# Patient Record
Sex: MALE | Race: WHITE | Employment: FULL TIME | URBAN - METROPOLITAN AREA
[De-identification: names, ages, dates, MRNs, and addresses within clinical notes are randomized per-mention and may not be internally consistent; named-entity substitution may affect disease eponyms.]

---

## 2017-05-19 ENCOUNTER — OFFICE VISIT (OUTPATIENT)
Dept: FAMILY MEDICINE CLINIC | Facility: CLINIC | Age: 47
End: 2017-05-19

## 2017-05-19 VITALS
HEART RATE: 64 BPM | TEMPERATURE: 98 F | OXYGEN SATURATION: 99 % | DIASTOLIC BLOOD PRESSURE: 68 MMHG | HEIGHT: 73.5 IN | RESPIRATION RATE: 12 BRPM | BODY MASS INDEX: 28.54 KG/M2 | SYSTOLIC BLOOD PRESSURE: 120 MMHG | WEIGHT: 220 LBS

## 2017-05-19 DIAGNOSIS — G89.29 CHRONIC LEFT SHOULDER PAIN: ICD-10-CM

## 2017-05-19 DIAGNOSIS — M51.26 PROTRUDED LUMBAR DISC: ICD-10-CM

## 2017-05-19 DIAGNOSIS — M25.512 CHRONIC LEFT SHOULDER PAIN: ICD-10-CM

## 2017-05-19 DIAGNOSIS — M51.36 ANNULAR TEAR OF LUMBAR DISC: ICD-10-CM

## 2017-05-19 DIAGNOSIS — M51.36 DEGENERATIVE DISC DISEASE, LUMBAR: Primary | ICD-10-CM

## 2017-05-19 PROCEDURE — 99214 OFFICE O/P EST MOD 30 MIN: CPT | Performed by: FAMILY MEDICINE

## 2017-05-19 NOTE — PROGRESS NOTES
HPI:    Patient ID: Christina Perea is a 40year old male. HPI  Patient is here with complaint of having chronic exacerbations of his low back pain which radiates to the right lower extremity below the knee at times.   He also states of having a lot of pain ASSESSMENT/PLAN:   Degenerative disc disease, lumbar  (primary encounter diagnosis)  Protruded lumbar disc  Annular tear of lumbar disc  Chronic left shoulder pain  I did review patient's chart again and he has had some treatment in his own home country

## 2017-06-05 ENCOUNTER — TELEPHONE (OUTPATIENT)
Dept: FAMILY MEDICINE CLINIC | Facility: CLINIC | Age: 47
End: 2017-06-05

## 2017-06-05 ENCOUNTER — MED REC SCAN ONLY (OUTPATIENT)
Dept: FAMILY MEDICINE CLINIC | Facility: CLINIC | Age: 47
End: 2017-06-05

## 2017-06-05 NOTE — TELEPHONE ENCOUNTER
Received subpoena/authorization for release of any and all billing records, itemized billing statements, etc to be faxed to [phone] no later than 6-13-17. Faxed request to [employer] for processing.

## 2017-06-09 ENCOUNTER — TELEPHONE (OUTPATIENT)
Dept: FAMILY MEDICINE CLINIC | Facility: CLINIC | Age: 47
End: 2017-06-09

## 2017-06-09 NOTE — TELEPHONE ENCOUNTER
Received Med Rec req from [address] [place] [address] for records and billing copy sent to med rec dept

## 2017-06-22 ENCOUNTER — OFFICE VISIT (OUTPATIENT)
Dept: SURGERY | Facility: CLINIC | Age: 47
End: 2017-06-22

## 2017-06-22 VITALS
BODY MASS INDEX: 32.58 KG/M2 | WEIGHT: 220 LBS | OXYGEN SATURATION: 97 % | HEIGHT: 69 IN | RESPIRATION RATE: 16 BRPM | SYSTOLIC BLOOD PRESSURE: 124 MMHG | DIASTOLIC BLOOD PRESSURE: 74 MMHG | HEART RATE: 63 BPM

## 2017-06-22 DIAGNOSIS — G89.29 CHRONIC LEFT SHOULDER PAIN: ICD-10-CM

## 2017-06-22 DIAGNOSIS — M75.50 SUBDELTOID BURSITIS, UNSPECIFIED LATERALITY: ICD-10-CM

## 2017-06-22 DIAGNOSIS — M54.16 LUMBAR RADICULITIS: Primary | ICD-10-CM

## 2017-06-22 DIAGNOSIS — G89.29 CHRONIC RIGHT SHOULDER PAIN: ICD-10-CM

## 2017-06-22 DIAGNOSIS — M46.1 SACROILIITIS, NOT ELSEWHERE CLASSIFIED (HCC): ICD-10-CM

## 2017-06-22 DIAGNOSIS — M47.819 SPONDYLOSIS WITHOUT MYELOPATHY: ICD-10-CM

## 2017-06-22 DIAGNOSIS — M25.512 CHRONIC LEFT SHOULDER PAIN: ICD-10-CM

## 2017-06-22 DIAGNOSIS — M25.511 CHRONIC RIGHT SHOULDER PAIN: ICD-10-CM

## 2017-06-22 DIAGNOSIS — M54.16 RIGHT LUMBAR RADICULITIS: ICD-10-CM

## 2017-06-22 DIAGNOSIS — M47.816 LUMBAR FACET ARTHROPATHY: ICD-10-CM

## 2017-06-22 PROCEDURE — 99205 OFFICE O/P NEW HI 60 MIN: CPT | Performed by: PHYSICIAN ASSISTANT

## 2017-06-22 RX ORDER — GABAPENTIN 300 MG/1
300 CAPSULE ORAL 3 TIMES DAILY
Qty: 90 CAPSULE | Refills: 2 | Status: SHIPPED | OUTPATIENT
Start: 2017-06-22 | End: 2018-07-02 | Stop reason: ALTCHOICE

## 2017-06-22 NOTE — PATIENT INSTRUCTIONS
Refill policies:    • Allow 2-3 business days for refills; controlled substances may take longer.   • Contact your pharmacy at least 5 days prior to running out of medication and have them send an electronic request or submit request through the [facility] have a procedure or additional testing performed. [facility]) will contact your insurance carrier to obtain pre-certification or prior authorization.     Unfortunately, DARIRAN has seen an increase in denial of payment even though the p

## 2017-06-22 NOTE — PROGRESS NOTES
Name: David Bucio   : 1970   DOS: 2017     Chief complaint: Patient presents with:  New Patient: bilateral shoulder pain  Low Back Pain: mostly right side      History of present illness: Hosea Galeas is a 40year old male sent from Dr Monica Langley office complaining of  Bilateral R> L shoulder pain and Chronic low back pain shooting down the right buttock posterior thigh sometimes stops at the knee but often shoots down to the foot.   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 He did physical therapy which provided no help he also did 1 set of steroid injections with sounds like lumbar facet injections.     He  He states that the cold Constitutional: negative for fever, weight loss and malaise/fatigue  Cardiovascular:  Denies shortness of breath, chest pain, dyspnea on exertion, ischemia in extremities. Endocrine: Negative.  Specifically denies thyroid disorder, diabetes mellitus, osteo Examination of the lower back:  + Tenderness over bilateral lumbar facets and mild tenderness over the right SI. The right lumbar facet is the most painful area with moderate to severe level of pain.   Patient has straight leg raise on the right at 30° and CRANIOCERVICAL AREA:  Normal foramen magnum with no Chiari malformation.     PARASPINAL AREA:  Normal with no visible mass.     BONY STRUCTURES:  Vertebral body height maintained.     CORD:  Normal caliber, contour, and signal intensity.         =====  CON CONCLUSION:  The L2-3 disc reveals desiccation and disc height loss. There is an associated mild left lateral disc protrusion with an annular tear. There is abutment of the exiting left L2 nerve root with mild left neural foraminal stenosis.  Please    eduardo Pt going back to [facility] to use the mineral [employer] and will be coming back later. Patient try gabapentin 300 mg 3 times daily. For the first couple days he should try nightly and see if it makes him sleepy if it does not he should take it every 8 hours.

## 2017-07-06 ENCOUNTER — TELEPHONE (OUTPATIENT)
Dept: FAMILY MEDICINE CLINIC | Facility: CLINIC | Age: 47
End: 2017-07-06

## 2017-07-06 NOTE — TELEPHONE ENCOUNTER
Received subpoena/ request on 7-6-17 for any/all records, including billing statements, [facility] on behalf of [address] [place] [address]. Records were due 6-13-17. Please fax to [phone]. Faxed to [employer] for processing.

## 2017-08-08 ENCOUNTER — TELEPHONE (OUTPATIENT)
Dept: FAMILY MEDICINE CLINIC | Facility: CLINIC | Age: 47
End: 2017-08-08

## 2017-08-08 NOTE — TELEPHONE ENCOUNTER
Received request for medical records. See TE from 7-6 for specific information. Refaxed to [employer] for processing.

## 2018-07-02 ENCOUNTER — OFFICE VISIT (OUTPATIENT)
Dept: FAMILY MEDICINE CLINIC | Facility: CLINIC | Age: 48
End: 2018-07-02

## 2018-07-02 VITALS
OXYGEN SATURATION: 98 % | WEIGHT: 203 LBS | SYSTOLIC BLOOD PRESSURE: 118 MMHG | HEIGHT: 69 IN | TEMPERATURE: 98 F | RESPIRATION RATE: 12 BRPM | HEART RATE: 71 BPM | BODY MASS INDEX: 30.07 KG/M2 | DIASTOLIC BLOOD PRESSURE: 78 MMHG

## 2018-07-02 DIAGNOSIS — M54.41 CHRONIC BILATERAL LOW BACK PAIN WITH BILATERAL SCIATICA: ICD-10-CM

## 2018-07-02 DIAGNOSIS — M54.42 CHRONIC BILATERAL LOW BACK PAIN WITH BILATERAL SCIATICA: ICD-10-CM

## 2018-07-02 DIAGNOSIS — M79.602 PAIN IN BOTH UPPER EXTREMITIES: ICD-10-CM

## 2018-07-02 DIAGNOSIS — G89.29 CHRONIC BILATERAL LOW BACK PAIN WITH BILATERAL SCIATICA: ICD-10-CM

## 2018-07-02 DIAGNOSIS — M54.2 NECK PAIN: Primary | ICD-10-CM

## 2018-07-02 DIAGNOSIS — M79.601 PAIN IN BOTH UPPER EXTREMITIES: ICD-10-CM

## 2018-07-02 PROCEDURE — 99213 OFFICE O/P EST LOW 20 MIN: CPT | Performed by: FAMILY MEDICINE

## 2018-07-02 RX ORDER — ETODOLAC 400 MG/1
400 TABLET, FILM COATED ORAL 2 TIMES DAILY PRN
Qty: 60 TABLET | Refills: 0 | Status: SHIPPED | OUTPATIENT
Start: 2018-07-02 | End: 2018-08-20

## 2018-07-02 RX ORDER — HYDROCODONE BITARTRATE AND ACETAMINOPHEN 10; 325 MG/1; MG/1
1 TABLET ORAL EVERY 6 HOURS PRN
Qty: 20 TABLET | Refills: 0 | Status: SHIPPED | OUTPATIENT
Start: 2018-07-02 | End: 2018-08-20

## 2018-07-02 NOTE — PROGRESS NOTES
HPI:    Patient ID: Srinivas Cervantes is a 56year old male.     HPI  Patient is here with complaint of having chronic neck and low back pain with radiation to bilateral upper extremities and bilateral lower extremities but right lower extremity greater than left stretching. Will write note for work. No orders of the defined types were placed in this encounter.       Meds This Visit:  No prescriptions requested or ordered in this encounter    Imaging & Referrals:  None       CC#[id number]

## 2018-08-20 ENCOUNTER — OFFICE VISIT (OUTPATIENT)
Dept: FAMILY MEDICINE CLINIC | Facility: CLINIC | Age: 48
End: 2018-08-20
Payer: COMMERCIAL

## 2018-08-20 VITALS
TEMPERATURE: 98 F | DIASTOLIC BLOOD PRESSURE: 78 MMHG | BODY MASS INDEX: 32.14 KG/M2 | RESPIRATION RATE: 12 BRPM | HEART RATE: 67 BPM | HEIGHT: 69 IN | WEIGHT: 217 LBS | OXYGEN SATURATION: 100 % | SYSTOLIC BLOOD PRESSURE: 120 MMHG

## 2018-08-20 DIAGNOSIS — M54.41 CHRONIC BILATERAL LOW BACK PAIN WITH BILATERAL SCIATICA: ICD-10-CM

## 2018-08-20 DIAGNOSIS — M54.2 NECK PAIN: ICD-10-CM

## 2018-08-20 DIAGNOSIS — M51.36 DEGENERATIVE DISC DISEASE, LUMBAR: ICD-10-CM

## 2018-08-20 DIAGNOSIS — G89.29 CHRONIC BILATERAL LOW BACK PAIN WITH BILATERAL SCIATICA: ICD-10-CM

## 2018-08-20 DIAGNOSIS — M54.42 CHRONIC BILATERAL LOW BACK PAIN WITH BILATERAL SCIATICA: ICD-10-CM

## 2018-08-20 PROCEDURE — 99213 OFFICE O/P EST LOW 20 MIN: CPT | Performed by: FAMILY MEDICINE

## 2018-08-20 NOTE — PROGRESS NOTES
HPI:    Patient ID: Mikey Campos is a 56year old male. HPI  Patient is here for follow-up of chronic low back pain and neck pain. He states he is feeling much better now he is on no medication for pain.   He would like to return to work tomorrow without

## 2018-09-12 ENCOUNTER — TELEPHONE (OUTPATIENT)
Dept: FAMILY MEDICINE CLINIC | Facility: CLINIC | Age: 48
End: 2018-09-12

## 2018-09-12 NOTE — TELEPHONE ENCOUNTER
Dr Zaragoza Neighb[employer] rec'd forms to complete for w/c reimbursement pay for the patient.  He completed the forms and I faxed to [phone] - [facility] rec'd - sent to antonieta mares # [phone]

## 2018-11-12 ENCOUNTER — OFFICE VISIT (OUTPATIENT)
Dept: FAMILY MEDICINE CLINIC | Facility: CLINIC | Age: 48
End: 2018-11-12
Payer: COMMERCIAL

## 2018-11-12 VITALS
RESPIRATION RATE: 14 BRPM | BODY MASS INDEX: 32.88 KG/M2 | HEART RATE: 64 BPM | TEMPERATURE: 98 F | SYSTOLIC BLOOD PRESSURE: 118 MMHG | OXYGEN SATURATION: 100 % | HEIGHT: 69 IN | DIASTOLIC BLOOD PRESSURE: 78 MMHG | WEIGHT: 222 LBS

## 2018-11-12 DIAGNOSIS — G89.29 CHRONIC RIGHT SHOULDER PAIN: ICD-10-CM

## 2018-11-12 DIAGNOSIS — M25.511 CHRONIC RIGHT SHOULDER PAIN: ICD-10-CM

## 2018-11-12 DIAGNOSIS — M54.42 CHRONIC BILATERAL LOW BACK PAIN WITH BILATERAL SCIATICA: Primary | ICD-10-CM

## 2018-11-12 DIAGNOSIS — M54.2 CERVICAL PAIN: ICD-10-CM

## 2018-11-12 DIAGNOSIS — G89.29 CHRONIC BILATERAL LOW BACK PAIN WITH BILATERAL SCIATICA: Primary | ICD-10-CM

## 2018-11-12 DIAGNOSIS — M54.41 CHRONIC BILATERAL LOW BACK PAIN WITH BILATERAL SCIATICA: Primary | ICD-10-CM

## 2018-11-12 DIAGNOSIS — M51.36 DEGENERATIVE DISC DISEASE, LUMBAR: ICD-10-CM

## 2018-11-12 PROCEDURE — 99214 OFFICE O/P EST MOD 30 MIN: CPT | Performed by: FAMILY MEDICINE

## 2018-11-12 RX ORDER — HYDROCODONE BITARTRATE AND ACETAMINOPHEN 10; 325 MG/1; MG/1
1 TABLET ORAL EVERY 6 HOURS PRN
Qty: 30 TABLET | Refills: 0 | Status: SHIPPED | OUTPATIENT
Start: 2018-11-12 | End: 2019-01-21

## 2018-11-12 NOTE — PROGRESS NOTES
HPI:    Patient ID: Jen Nunez is a 53year old male. HPI  Patient states he would like a note to be off work for period of time due to exacerbation of neck pain, right shoulder pain, and low back pain.     Review of Systems  Except for the above, all o him a lot right now. He would also like refill of [place].  He states he would like off work for period of time. See work note. We will give off work accordingly.   I  told patient he should have a follow-up appointment at the end of the time that he is of

## 2018-12-03 ENCOUNTER — OFFICE VISIT (OUTPATIENT)
Dept: FAMILY MEDICINE CLINIC | Facility: CLINIC | Age: 48
End: 2018-12-03
Payer: COMMERCIAL

## 2018-12-03 VITALS
HEART RATE: 73 BPM | BODY MASS INDEX: 33.81 KG/M2 | TEMPERATURE: 98 F | OXYGEN SATURATION: 100 % | SYSTOLIC BLOOD PRESSURE: 114 MMHG | RESPIRATION RATE: 12 BRPM | DIASTOLIC BLOOD PRESSURE: 70 MMHG | HEIGHT: 69 IN | WEIGHT: 228.25 LBS

## 2018-12-03 DIAGNOSIS — M54.42 CHRONIC BILATERAL LOW BACK PAIN WITH BILATERAL SCIATICA: ICD-10-CM

## 2018-12-03 DIAGNOSIS — M51.36 DEGENERATIVE DISC DISEASE, LUMBAR: Primary | ICD-10-CM

## 2018-12-03 DIAGNOSIS — M54.41 CHRONIC BILATERAL LOW BACK PAIN WITH BILATERAL SCIATICA: ICD-10-CM

## 2018-12-03 DIAGNOSIS — G89.29 CHRONIC BILATERAL LOW BACK PAIN WITH BILATERAL SCIATICA: ICD-10-CM

## 2018-12-03 PROCEDURE — 99213 OFFICE O/P EST LOW 20 MIN: CPT | Performed by: FAMILY MEDICINE

## 2018-12-03 NOTE — PROGRESS NOTES
HPI:    Patient ID: Latoya Hidalgo is a 53year old male. HPI  Patient is here for follow-up of chronic low back pain with degenerative disc disease. He needs a form filled out to return to work which she did yesterday on 12-18.   Review of Systems  Negati defined types were placed in this encounter.       Meds This Visit:  Requested Prescriptions     Signed Prescriptions Disp Refills   • Elastic Bandages & Supports (LUMBAR BACK BRACE/SUPPORT PAD) Does not apply Misc 1 each 0     Si Application by Does no

## 2018-12-12 ENCOUNTER — TELEPHONE (OUTPATIENT)
Dept: FAMILY MEDICINE CLINIC | Facility: CLINIC | Age: 48
End: 2018-12-12

## 2018-12-12 ENCOUNTER — OFFICE VISIT (OUTPATIENT)
Dept: FAMILY MEDICINE CLINIC | Facility: CLINIC | Age: 48
End: 2018-12-12
Payer: COMMERCIAL

## 2018-12-12 VITALS
TEMPERATURE: 98 F | HEIGHT: 69 IN | OXYGEN SATURATION: 100 % | RESPIRATION RATE: 12 BRPM | SYSTOLIC BLOOD PRESSURE: 120 MMHG | HEART RATE: 79 BPM | DIASTOLIC BLOOD PRESSURE: 78 MMHG | WEIGHT: 224 LBS | BODY MASS INDEX: 33.18 KG/M2

## 2018-12-12 DIAGNOSIS — H66.92 LEFT OTITIS MEDIA, UNSPECIFIED OTITIS MEDIA TYPE: ICD-10-CM

## 2018-12-12 DIAGNOSIS — J02.9 SORE THROAT: Primary | ICD-10-CM

## 2018-12-12 DIAGNOSIS — R05.8 PRODUCTIVE COUGH: ICD-10-CM

## 2018-12-12 PROCEDURE — 99213 OFFICE O/P EST LOW 20 MIN: CPT | Performed by: FAMILY MEDICINE

## 2018-12-12 RX ORDER — AZITHROMYCIN 250 MG/1
TABLET, FILM COATED ORAL
Qty: 6 TABLET | Refills: 0 | Status: SHIPPED | OUTPATIENT
Start: 2018-12-12 | End: 2019-01-21

## 2018-12-12 RX ORDER — METHYLPREDNISOLONE 4 MG/1
TABLET ORAL
Qty: 1 KIT | Refills: 0 | Status: SHIPPED | OUTPATIENT
Start: 2018-12-12 | End: 2019-01-21

## 2018-12-12 NOTE — TELEPHONE ENCOUNTER
A few days ago - sore throat, coughing - dry - hard, short of breath at night, Feels feverish, Rattling in chest.  Son has pneumonia. I spoke with Dr. Zara Farr and he will see patient today at 12:15.

## 2018-12-12 NOTE — TELEPHONE ENCOUNTER
Pts wife is wanting for pt to be seen today. There are no openings for Dr. Aniya Rodriguez or José Luis Caraballo today. I tried to offer an appt tomorrow morning but Du Mcconnell is saying she feels pt needs to be seen and put on antibiotics today.  She is afraid it could be pneumonia

## 2018-12-12 NOTE — PROGRESS NOTES
HPI:   Latoya Hidalgo is a 53year old male that presents for   10 days ago- son was diagnosed with pneumonia    Patient has some cough congestion and sore throat for the past several days or more.   Past medical, surgical, family and social history reviewed i erythema or effusion   Nose: patent, no nasal discharge    Throat:  No tonsillar erythema or exudate. Mouth:  No oral lesions or ulcerations, good dentition. NECK: Supple, no cervical LAD, no thyromegaly.   SKIN: No rashes, no skin lesion, no bruising,

## 2019-01-21 ENCOUNTER — OFFICE VISIT (OUTPATIENT)
Dept: FAMILY MEDICINE CLINIC | Facility: CLINIC | Age: 49
End: 2019-01-21
Payer: COMMERCIAL

## 2019-01-21 VITALS
HEIGHT: 69 IN | OXYGEN SATURATION: 100 % | SYSTOLIC BLOOD PRESSURE: 130 MMHG | DIASTOLIC BLOOD PRESSURE: 70 MMHG | RESPIRATION RATE: 14 BRPM | HEART RATE: 82 BPM | TEMPERATURE: 98 F | WEIGHT: 235 LBS | BODY MASS INDEX: 34.8 KG/M2

## 2019-01-21 DIAGNOSIS — M54.41 ACUTE RIGHT-SIDED LOW BACK PAIN WITH RIGHT-SIDED SCIATICA: Primary | ICD-10-CM

## 2019-01-21 DIAGNOSIS — Z00.00 ROUTINE ADULT HEALTH MAINTENANCE: ICD-10-CM

## 2019-01-21 DIAGNOSIS — E55.9 VITAMIN D DEFICIENCY: ICD-10-CM

## 2019-01-21 DIAGNOSIS — R21 RASH: ICD-10-CM

## 2019-01-21 DIAGNOSIS — R05.9 COUGH: ICD-10-CM

## 2019-01-21 PROCEDURE — 99214 OFFICE O/P EST MOD 30 MIN: CPT | Performed by: FAMILY MEDICINE

## 2019-01-21 RX ORDER — HYDROCODONE BITARTRATE AND ACETAMINOPHEN 10; 325 MG/1; MG/1
1 TABLET ORAL EVERY 6 HOURS PRN
Qty: 30 TABLET | Refills: 0 | Status: SHIPPED | OUTPATIENT
Start: 2019-01-21 | End: 2019-06-11

## 2019-01-21 RX ORDER — PREDNISONE 20 MG/1
20 TABLET ORAL 2 TIMES DAILY
Qty: 10 TABLET | Refills: 0 | Status: SHIPPED | OUTPATIENT
Start: 2019-01-21 | End: 2019-01-26

## 2019-01-21 NOTE — PROGRESS NOTES
HPI:   Raulito Cardenas is a 53year old male that presents for low back pain. 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 He hurt his lower back on right side.    Pt ran out of [employer] and is requesting a well groomed. Physical Exam:  GEN:  Patient is alert, awake and oriented, well developed, well nourished, no apparent distress.   HEENT:     Head:  Normocephalic, atraumatic    Eyes: EOMI, PERRLA, no scleral icterus, conjunctivae clear, no eye discharge Mary Anne Aviles M.D.   Family Medicine   1/21/2019  11:21 AM

## 2019-03-08 ENCOUNTER — HOSPITAL ENCOUNTER (OUTPATIENT)
Dept: GENERAL RADIOLOGY | Age: 49
Discharge: HOME | End: 2019-03-08
Attending: FAMILY MEDICINE
Payer: COMMERCIAL

## 2019-03-08 ENCOUNTER — OFFICE VISIT (OUTPATIENT)
Dept: FAMILY MEDICINE CLINIC | Facility: CLINIC | Age: 49
End: 2019-03-08
Payer: COMMERCIAL

## 2019-03-08 ENCOUNTER — APPOINTMENT (OUTPATIENT)
Dept: LAB | Age: 49
End: 2019-03-08
Attending: FAMILY MEDICINE
Payer: COMMERCIAL

## 2019-03-08 VITALS
WEIGHT: 227 LBS | HEIGHT: 69 IN | BODY MASS INDEX: 33.62 KG/M2 | OXYGEN SATURATION: 99 % | HEART RATE: 89 BPM | SYSTOLIC BLOOD PRESSURE: 122 MMHG | RESPIRATION RATE: 14 BRPM | TEMPERATURE: 98 F | DIASTOLIC BLOOD PRESSURE: 70 MMHG

## 2019-03-08 DIAGNOSIS — R05.9 COUGH: ICD-10-CM

## 2019-03-08 DIAGNOSIS — E55.9 VITAMIN D DEFICIENCY: ICD-10-CM

## 2019-03-08 DIAGNOSIS — R21 RASH: ICD-10-CM

## 2019-03-08 DIAGNOSIS — M54.41 CHRONIC BILATERAL LOW BACK PAIN WITH RIGHT-SIDED SCIATICA: Primary | ICD-10-CM

## 2019-03-08 DIAGNOSIS — Z00.00 ROUTINE ADULT HEALTH MAINTENANCE: ICD-10-CM

## 2019-03-08 DIAGNOSIS — M54.2 CERVICAL PAIN: ICD-10-CM

## 2019-03-08 DIAGNOSIS — J30.9 ALLERGIC RHINITIS, UNSPECIFIED SEASONALITY, UNSPECIFIED TRIGGER: ICD-10-CM

## 2019-03-08 DIAGNOSIS — G89.29 CHRONIC BILATERAL LOW BACK PAIN WITH RIGHT-SIDED SCIATICA: Primary | ICD-10-CM

## 2019-03-08 LAB
ALBUMIN SERPL-MCNC: 4.2 G/DL (ref 3.4–5)
ALBUMIN/GLOB SERPL: 1.4 {RATIO} (ref 1–2)
ALP LIVER SERPL-CCNC: 56 U/L (ref 45–117)
ALT SERPL-CCNC: 46 U/L (ref 16–61)
ANION GAP SERPL CALC-SCNC: 8 MMOL/L (ref 0–18)
AST SERPL-CCNC: 18 U/L (ref 15–37)
BILIRUB SERPL-MCNC: 1.8 MG/DL (ref 0.1–2)
BUN BLD-MCNC: 15 MG/DL (ref 7–18)
BUN/CREAT SERPL: 15.8 (ref 10–20)
CALCIUM BLD-MCNC: 8.9 MG/DL (ref 8.5–10.1)
CHLORIDE SERPL-SCNC: 109 MMOL/L (ref 98–107)
CHOLEST SMN-MCNC: 176 MG/DL (ref ?–200)
CO2 SERPL-SCNC: 23 MMOL/L (ref 21–32)
CREAT BLD-MCNC: 0.95 MG/DL (ref 0.7–1.3)
DEPRECATED RDW RBC AUTO: 40.7 FL (ref 35.1–46.3)
ERYTHROCYTE [DISTWIDTH] IN BLOOD BY AUTOMATED COUNT: 12.4 % (ref 11–15)
GLOBULIN PLAS-MCNC: 3.1 G/DL (ref 2.8–4.4)
GLUCOSE BLD-MCNC: 99 MG/DL (ref 70–99)
HCT VFR BLD AUTO: 47.9 % (ref 39–53)
HDLC SERPL-MCNC: 43 MG/DL (ref 40–59)
HGB BLD-MCNC: 16.2 G/DL (ref 13–17.5)
LDLC SERPL CALC-MCNC: 115 MG/DL (ref ?–100)
M PROTEIN MFR SERPL ELPH: 7.3 G/DL (ref 6.4–8.2)
MCH RBC QN AUTO: 30.5 PG (ref 26–34)
MCHC RBC AUTO-ENTMCNC: 33.8 G/DL (ref 31–37)
MCV RBC AUTO: 90 FL (ref 80–100)
NONHDLC SERPL-MCNC: 133 MG/DL (ref ?–130)
OSMOLALITY SERPL CALC.SUM OF ELEC: 291 MOSM/KG (ref 275–295)
PLATELET # BLD AUTO: 235 10(3)UL (ref 150–450)
POTASSIUM SERPL-SCNC: 3.9 MMOL/L (ref 3.5–5.1)
RBC # BLD AUTO: 5.32 X10(6)UL (ref 4.3–5.7)
SODIUM SERPL-SCNC: 140 MMOL/L (ref 136–145)
T4 FREE SERPL-MCNC: 1 NG/DL (ref 0.8–1.7)
TRIGL SERPL-MCNC: 89 MG/DL (ref 30–149)
TSI SER-ACNC: 0.84 MIU/ML (ref 0.36–3.74)
VIT D+METAB SERPL-MCNC: 28.6 NG/ML (ref 30–100)
VLDLC SERPL CALC-MCNC: 18 MG/DL (ref 0–30)
WBC # BLD AUTO: 4.2 X10(3) UL (ref 4–11)

## 2019-03-08 PROCEDURE — 84443 ASSAY THYROID STIM HORMONE: CPT

## 2019-03-08 PROCEDURE — 80061 LIPID PANEL: CPT

## 2019-03-08 PROCEDURE — 99214 OFFICE O/P EST MOD 30 MIN: CPT | Performed by: FAMILY MEDICINE

## 2019-03-08 PROCEDURE — 36415 COLL VENOUS BLD VENIPUNCTURE: CPT

## 2019-03-08 PROCEDURE — 86003 ALLG SPEC IGE CRUDE XTRC EA: CPT

## 2019-03-08 PROCEDURE — 82306 VITAMIN D 25 HYDROXY: CPT

## 2019-03-08 PROCEDURE — 71046 X-RAY EXAM CHEST 2 VIEWS: CPT | Performed by: FAMILY MEDICINE

## 2019-03-08 PROCEDURE — 80053 COMPREHEN METABOLIC PANEL: CPT

## 2019-03-08 PROCEDURE — 82785 ASSAY OF IGE: CPT

## 2019-03-08 PROCEDURE — 85027 COMPLETE CBC AUTOMATED: CPT

## 2019-03-08 PROCEDURE — 84439 ASSAY OF FREE THYROXINE: CPT

## 2019-03-08 NOTE — PROGRESS NOTES
HPI:   Hector Agarwal is a 53year old male that presents for back pain and neck pain. Patient would like to get a referral to pain doctor. Patient states pain is getting worse and is starting to effect work. Patient is also complaining of lung issues. Estimated body mass index is 33.52 kg/m² as calculated from the following:    Height as of this encounter: 69\". Weight as of this encounter: 227 lb. Vital signs reviewed. Appears stated age, well groomed.   Physical Exam:  GEN:  Patient is alert, awake well.  Risks, benefits, and alternatives of current treatment plan discussed in detail. Questions and concerns addressed. Red flags to RTC or ED reviewed. Patient (or parent) agrees to plan. No Follow-up on file. Jonathan Manzo M.D.   Family Medic

## 2019-03-11 ENCOUNTER — TELEPHONE (OUTPATIENT)
Dept: SURGERY | Facility: CLINIC | Age: 49
End: 2019-03-11

## 2019-03-12 LAB
ALLERGEN,  IGE: 0.62 KU/L
ALLERGEN,  SHRIMP IGE: <0.1 KU/L
ALLERGEN,  TREE IGE: 1.5 KU/L
ALLERGEN, A.ALTERNATA(TENUIS): <0.1 KU/L
ALLERGEN, BERMUDA GRASS IGE: 2.35 KU/L
ALLERGEN, BOX ELDER/MAPLE IGE: 2.01 KU/L
ALLERGEN, CAT DANDER IGE: <0.1 KU/L
ALLERGEN, CLAM IGE: <0.1 KU/L
ALLERGEN, CODFISH: <0.1 KU/L
ALLERGEN, CORN IGE: <0.1 KU/L
ALLERGEN, COTTONWOOD IGE: 1.83 KU/L
ALLERGEN, D. FARINAE IGE: <0.1 KU/L
ALLERGEN, D.PTERONYSSINUS IGE: <0.1 KU/L
ALLERGEN, DOG DANDER IGE: <0.1 KU/L
ALLERGEN, EGG WHITE IGE: <0.1 KU/L
ALLERGEN, GERMAN COCKROACH IGE: <0.1 KU/L
ALLERGEN, HORMODENDRUM IGE: <0.1 KU/L
ALLERGEN, MARSH ELDER IGE: 2.36 KU/L
ALLERGEN, MILK (COW) IGE: <0.1 KU/L
ALLERGEN, MILK (COW) IGE: <0.1 KU/L
ALLERGEN, MOUNTAIN CEDAR IGE: 0.21 KU/L
ALLERGEN, MOUSE EPITHE IGE: <0.1 KU/L
ALLERGEN, MUCOR RACEMOSUS IGE: <0.1 KU/L
ALLERGEN, OAK TREE IGE: 2.87 KU/L
ALLERGEN, PEANUT IGE: 0.14 KU/L
ALLERGEN, PEANUT IGE: 0.14 KU/L
ALLERGEN, PECAN TREE IGE: 1.63 KU/L
ALLERGEN, PENICILLIUM NOTATUM: <0.1 KU/L
ALLERGEN, PIGWEED IGE: 1.85 KU/L
ALLERGEN, RUSSIAN THISTLE IGE: 1.26 KU/L
ALLERGEN, SCALLOP IGE: <0.1 KU/L
ALLERGEN, SHORT RAGWEED IGE: 12.8 KU/L
ALLERGEN, SOYBEAN IGE: <0.1 KU/L
ALLERGEN, TIMOTHY GRASS IGE: 2.27 KU/L
ALLERGEN, WALNUT TREE IGE: 2.09 KU/L
ALLERGEN, WALNUT/BLACK WALNUT: <0.1 KU/L
ALLERGEN, WHEAT IGE: <0.1 KU/L
ALLERGEN, WHITE ASH IGE: 2.28 KU/L
ALLERGEN, WHITE MULBERRY IGE: 0.98 KU/L
ALLERGEN,ASPERGILLUS FUMIGATUS: <0.1 KU/L
IMMUNOGLOBULIN E: 38 KU/L
IMMUNOGLOBULIN E: 38 KU/L

## 2019-03-29 ENCOUNTER — TELEPHONE (OUTPATIENT)
Dept: FAMILY MEDICINE CLINIC | Facility: CLINIC | Age: 49
End: 2019-03-29

## 2019-03-29 NOTE — TELEPHONE ENCOUNTER
----- Message from Chari Roque MD sent at 3/12/2019  2:28 PM CDT -----  F/u appt needed for 3/15/19(1 week from last appt on 3/8/19)

## 2019-04-16 ENCOUNTER — OFFICE VISIT (OUTPATIENT)
Dept: FAMILY MEDICINE CLINIC | Facility: CLINIC | Age: 49
End: 2019-04-16
Payer: COMMERCIAL

## 2019-04-16 VITALS
BODY MASS INDEX: 30.22 KG/M2 | SYSTOLIC BLOOD PRESSURE: 122 MMHG | HEIGHT: 73 IN | WEIGHT: 228 LBS | DIASTOLIC BLOOD PRESSURE: 80 MMHG | OXYGEN SATURATION: 99 % | HEART RATE: 80 BPM | TEMPERATURE: 98 F | RESPIRATION RATE: 16 BRPM

## 2019-04-16 DIAGNOSIS — J30.89 ALLERGIC RHINITIS DUE TO OTHER ALLERGIC TRIGGER, UNSPECIFIED SEASONALITY: Primary | ICD-10-CM

## 2019-04-16 DIAGNOSIS — Z91.09 ALLERGY TO ENVIRONMENTAL FACTORS: ICD-10-CM

## 2019-04-16 DIAGNOSIS — R06.2 WHEEZING: ICD-10-CM

## 2019-04-16 PROCEDURE — 99396 PREV VISIT EST AGE 40-64: CPT | Performed by: FAMILY MEDICINE

## 2019-04-16 PROCEDURE — 99213 OFFICE O/P EST LOW 20 MIN: CPT | Performed by: FAMILY MEDICINE

## 2019-04-16 RX ORDER — EMTRICITABINE AND TENOFOVIR DISOPROXIL FUMARATE 200; 300 MG/1; MG/1
1 TABLET, FILM COATED ORAL EVERY MORNING
COMMUNITY
Start: 2019-03-04 | End: 2019-06-11

## 2019-04-16 NOTE — PROGRESS NOTES
Jen Nunez is a 53year old male who presents for a complete physical exam.   HPI:   Pt complains having allergies where he has itching over the body. He did have allergy testing done. Also follow-up of neck and shoulder pain.   He has seen pain manageme Total 176 <200 mg/dL    HDL Cholesterol 43 40 - 59 mg/dL    Triglycerides 89 30 - 149 mg/dL    LDL Cholesterol 115 (H) <100 mg/dL    VLDL 18 0 - 30 mg/dL    Non HDL Chol 133 (H) <130 mg/dL   TSH+FREE T4   Result Value Ref Range    Free T4 1.0 0.8 - 1.7 ng/ Ref Range    Allergen, Clam IgE <0.10 <=0.34 kU/L    Allergen, Codfish <0.10 <=0.34 kU/L    Allergen, Corn IgE <0.10 <=0.34 kU/L    Allergen, Egg White IgE <0.10 <=0.34 kU/L    Allergen, Milk (Cow) IgE <0.10 <=0.34 kU/L    Allergen, Peanut IgE 0.14 <=0.34 in appetite  : denies nocturia or changes in urinary stream, denies scrotal mass/abnormal discharge from urethra  MUSCULOSKELETAL: denies joint or muscle aches or pains  NEURO: denies headaches/dizziness  PSYCH: denies depression or anxiety  HEMATOLOGIC: today for lab results.     Diagnoses and all orders for this visit:    Allergic rhinitis due to other allergic trigger, unspecified seasonality    Allergy to environmental factors    Wheezing      Patient counseled at length regarding blood work and allergy

## 2019-04-16 NOTE — PROGRESS NOTES
HPI:   Caitlin Horne is a 53year old male that presents for lab discussion. Patient completed labs on 3/8/19 and he would like to discuss them. Past medical, surgical, family and social history reviewed in detail with patient.   Updated where appropriate Ears: External normal. TMs normal without erythema or effusion   Nose: patent, no nasal discharge    Throat:  No tonsillar erythema or exudate. Mouth:  No oral lesions or ulcerations, good dentition. NECK: Supple, no cervical LAD, no thyromegaly.

## 2019-06-11 ENCOUNTER — OFFICE VISIT (OUTPATIENT)
Dept: FAMILY MEDICINE CLINIC | Facility: CLINIC | Age: 49
End: 2019-06-11
Payer: COMMERCIAL

## 2019-06-11 VITALS
RESPIRATION RATE: 16 BRPM | WEIGHT: 221 LBS | HEART RATE: 63 BPM | BODY MASS INDEX: 29.29 KG/M2 | HEIGHT: 73 IN | TEMPERATURE: 98 F | DIASTOLIC BLOOD PRESSURE: 80 MMHG | SYSTOLIC BLOOD PRESSURE: 124 MMHG | OXYGEN SATURATION: 99 %

## 2019-06-11 DIAGNOSIS — R22.1 THROAT SWELLING: Primary | ICD-10-CM

## 2019-06-11 DIAGNOSIS — L30.9 DERMATITIS: ICD-10-CM

## 2019-06-11 DIAGNOSIS — R06.83 SNORING: ICD-10-CM

## 2019-06-11 DIAGNOSIS — J30.9 ALLERGIC RHINITIS, UNSPECIFIED SEASONALITY, UNSPECIFIED TRIGGER: ICD-10-CM

## 2019-06-11 PROCEDURE — 99214 OFFICE O/P EST MOD 30 MIN: CPT | Performed by: FAMILY MEDICINE

## 2019-06-11 RX ORDER — HYDROCODONE BITARTRATE AND ACETAMINOPHEN 10; 325 MG/1; MG/1
1 TABLET ORAL EVERY 6 HOURS PRN
Qty: 30 TABLET | Refills: 0 | Status: SHIPPED | OUTPATIENT
Start: 2019-06-11

## 2019-06-11 NOTE — PROGRESS NOTES
HPI:   Christina Perea is a 53year old male that presents for  Rash: all over body and he states that he is having trouble breathing as well. Patient also states of having a rash on the front part of his neck and chest since December 2018.   It itches a l index is 29.16 kg/m² as calculated from the following:    Height as of this encounter: 73\". Weight as of this encounter: 221 lb. Vital signs reviewed. Appears stated age, well groomed.   Physical Exam:  GEN:  Patient is alert, awake and oriented, well Ralph Garcia M.D.   [facility]   6/11/2019  9:04 AM

## 2019-07-18 ENCOUNTER — TELEPHONE (OUTPATIENT)
Dept: FAMILY MEDICINE CLINIC | Facility: CLINIC | Age: 49
End: 2019-07-18

## 2019-07-18 NOTE — TELEPHONE ENCOUNTER
Rec'd incoming medical record request for records from 06/2017 to the present. Please respond back by 07/24/19. Subpoena    Sent to [employer].

## 2019-10-02 ENCOUNTER — OFFICE VISIT (OUTPATIENT)
Dept: FAMILY MEDICINE CLINIC | Facility: CLINIC | Age: 49
End: 2019-10-02
Payer: COMMERCIAL

## 2019-10-02 VITALS
TEMPERATURE: 99 F | WEIGHT: 221.19 LBS | HEIGHT: 73 IN | DIASTOLIC BLOOD PRESSURE: 80 MMHG | SYSTOLIC BLOOD PRESSURE: 122 MMHG | HEART RATE: 79 BPM | BODY MASS INDEX: 29.31 KG/M2 | RESPIRATION RATE: 16 BRPM

## 2019-10-02 DIAGNOSIS — M54.41 ACUTE RIGHT-SIDED LOW BACK PAIN WITH RIGHT-SIDED SCIATICA: ICD-10-CM

## 2019-10-02 DIAGNOSIS — M54.2 NECK PAIN: ICD-10-CM

## 2019-10-02 DIAGNOSIS — D17.1 LIPOMA OF BACK: Primary | ICD-10-CM

## 2019-10-02 PROCEDURE — 99214 OFFICE O/P EST MOD 30 MIN: CPT | Performed by: FAMILY MEDICINE

## 2019-10-02 RX ORDER — MELOXICAM 7.5 MG/1
7.5 TABLET ORAL DAILY
Qty: 30 TABLET | Refills: 0 | Status: SHIPPED | OUTPATIENT
Start: 2019-10-02

## 2019-10-03 PROBLEM — D17.1 LIPOMA OF BACK: Status: ACTIVE | Noted: 2019-10-03

## 2019-10-03 NOTE — PROGRESS NOTES
HPI:   Regan Canales is a 51year old male that presents for Patient presents with:  Cyst: Shoulder   Imm/Inj: Declined flu vaccine  Back Pain: Low back /neck pain. Pain range 7/10   Leg Pain: Rt leg 7/10   Refill Request: Norco      Pt is here for f/u of chr 122/80 (BP Location: Right arm, Patient Position: Sitting)   Pulse 79   Temp 98.7 °F (37.1 °C) (Oral)   Resp 16   Ht 73\"   Wt 221 lb 3.2 oz (100.3 kg)   BMI 29.18 kg/m²  Estimated body mass index is 29.18 kg/m² as calculated from the following:    Height and concerns addressed. Red flags to RTC or ED reviewed. Patient (or parent) agrees to plan. No follow-ups on file. Arnav Delarosa M.D.   Family Medicine   10/3/2019  7:29 AM

## 2019-10-25 ENCOUNTER — OFFICE VISIT (OUTPATIENT)
Dept: SURGERY | Facility: CLINIC | Age: 49
End: 2019-10-25
Payer: COMMERCIAL

## 2019-10-25 VITALS — SYSTOLIC BLOOD PRESSURE: 110 MMHG | DIASTOLIC BLOOD PRESSURE: 72 MMHG | HEART RATE: 62 BPM | TEMPERATURE: 98 F

## 2019-10-25 DIAGNOSIS — D17.1 LIPOMA OF BACK: Primary | ICD-10-CM

## 2019-10-25 PROCEDURE — 99203 OFFICE O/P NEW LOW 30 MIN: CPT | Performed by: SURGERY

## 2019-10-25 NOTE — H&P
New Patient Visit Note       Active Problems      1. Lipoma of back        Chief Complaint   Patient presents with:  Cyst: lipoma located on back.  pt is here for surgery eval      History of Present Illness   The patient is a 44-year-old gentleman seen at back pain , do not use while working, may cause drowsiness). , Disp: 30 tablet, Rfl: 0  •  Fluticasone Propionate HFA (FLOVENT HFA) 110 MCG/ACT Inhalation Aerosol, Inhale 1 puff into the lungs 2 (two) times daily. , Disp: 3 Can, Rfl: 3  •  Elastic Bandages & He has no rhonchi. He has no rales. He exhibits no mass. Neurological: He is alert and oriented to person, place, and time. Skin: Skin is warm and dry. Psychiatric: He has a normal mood and affect.  His speech is normal and behavior is normal. Yannick Flynn

## 2019-12-20 ENCOUNTER — OFFICE VISIT (OUTPATIENT)
Dept: FAMILY MEDICINE CLINIC | Facility: CLINIC | Age: 49
End: 2019-12-20
Payer: COMMERCIAL

## 2019-12-20 VITALS
TEMPERATURE: 99 F | HEIGHT: 73 IN | WEIGHT: 219.63 LBS | BODY MASS INDEX: 29.11 KG/M2 | DIASTOLIC BLOOD PRESSURE: 70 MMHG | RESPIRATION RATE: 16 BRPM | HEART RATE: 65 BPM | SYSTOLIC BLOOD PRESSURE: 127 MMHG

## 2019-12-20 DIAGNOSIS — G89.29 CHRONIC BILATERAL LOW BACK PAIN WITH BILATERAL SCIATICA: Primary | ICD-10-CM

## 2019-12-20 DIAGNOSIS — M54.42 CHRONIC BILATERAL LOW BACK PAIN WITH BILATERAL SCIATICA: Primary | ICD-10-CM

## 2019-12-20 DIAGNOSIS — M25.511 CHRONIC RIGHT SHOULDER PAIN: ICD-10-CM

## 2019-12-20 DIAGNOSIS — M25.512 CHRONIC LEFT SHOULDER PAIN: ICD-10-CM

## 2019-12-20 DIAGNOSIS — G89.29 CHRONIC RIGHT SHOULDER PAIN: ICD-10-CM

## 2019-12-20 DIAGNOSIS — G89.29 CHRONIC LEFT SHOULDER PAIN: ICD-10-CM

## 2019-12-20 DIAGNOSIS — M54.41 CHRONIC BILATERAL LOW BACK PAIN WITH BILATERAL SCIATICA: Primary | ICD-10-CM

## 2019-12-20 PROCEDURE — 99214 OFFICE O/P EST MOD 30 MIN: CPT | Performed by: FAMILY MEDICINE

## 2019-12-20 RX ORDER — AMOXICILLIN 500 MG/1
CAPSULE ORAL
COMMUNITY
Start: 2019-12-06

## 2019-12-20 RX ORDER — CHLORHEXIDINE GLUCONATE 0.12 MG/ML
RINSE ORAL
COMMUNITY
Start: 2019-12-06

## 2019-12-20 NOTE — PROGRESS NOTES
HPI:   Hue Bay is a 51year old male that presents for Patient presents with:  Low Back Pain: Patient stated pain medication does not help out or PT. Shoulder Pain: Cleve shoulder pain - Pain radiates to his neck. Pain range 8/10        2013, shoulder a (LUMBAR BACK BRACE/SUPPORT PAD) Does not apply Misc, 1 Application by Does not apply route daily. , Disp: 1 each, Rfl: 0    REVIEW OF SYSTEMS:     Comprehensive ROS negative unless noted in HPI    PHYSICAL EXAM:   /70 (BP Location: Right arm, Patient unmanageable , then they would consider surgery.  Pt states he is going to [place] to go to some sort of europe[place] spring [place] which he states works better than physical therapy and other treatments he has done in the [place]     I told patient I will only give

## 2020-03-05 ENCOUNTER — TELEPHONE (OUTPATIENT)
Dept: FAMILY MEDICINE CLINIC | Facility: CLINIC | Age: 50
End: 2020-03-05

## 2020-03-05 NOTE — TELEPHONE ENCOUNTER
Recd  Subpoena (request and authorization) from [employer], on behalf of Erika Hi [address] [place] [address],[address],  for medical records from September 2017 to present, as well as billing records from July 2017 to present.  Please send on CD or chavo

## 2020-08-21 ENCOUNTER — TELEPHONE (OUTPATIENT)
Dept: FAMILY MEDICINE CLINIC | Facility: CLINIC | Age: 50
End: 2020-08-21

## 2020-08-21 NOTE — TELEPHONE ENCOUNTER
Recd reqeust and authorization from [employer] on behalf of [employer] for all records from 7- to present.  NT#[id number]. Faxed to [employer] for processing.

## 2024-09-01 ENCOUNTER — MED REC SCAN ONLY (OUTPATIENT)
Dept: FAMILY MEDICINE CLINIC | Facility: CLINIC | Age: 54
End: 2024-09-01

## 2024-09-24 ENCOUNTER — PATIENT OUTREACH (OUTPATIENT)
Dept: CASE MANAGEMENT | Age: 54
End: 2024-09-24

## 2024-09-24 ENCOUNTER — TELEPHONE (OUTPATIENT)
Dept: FAMILY MEDICINE CLINIC | Facility: CLINIC | Age: 54
End: 2024-09-24

## 2024-09-24 NOTE — PROCEDURES
The office order for PCP removal request is Approved and finalized on September 24, 2024.    Removed Fausto Hernandez MD as the patient's Primary Care Physician

## 2024-09-24 NOTE — TELEPHONE ENCOUNTER
Received subpoena request from [employer], Attorneys at [facility], [place], [address], [address], [place], [place], [address], with phone [phone] and fax [phone].Subpoena is for all medical records, billing records, and radiology images on CD.   Faxed to [employer] for processing.  
yes

## 2024-11-01 ENCOUNTER — MED REC SCAN ONLY (OUTPATIENT)
Dept: FAMILY MEDICINE CLINIC | Facility: CLINIC | Age: 54
End: 2024-11-01

## (undated) NOTE — LETTER
07/06/17        [facility]  [address]      Dear Primo Vilchis records indicate that you have outstanding lab work and or testing that was ordered for you and has not yet been completed:          CBC, Platelet, No Differenti

## (undated) NOTE — LETTER
02/20/19        Jen Nunez  [address]      Dear Silvia Victoria records indicate that you have outstanding lab work and or testing that was ordered for you and has not yet been completed:  Orders Placed This Encounter      CB

## (undated) NOTE — LETTER
Date: 3/8/2019    Patient Name: James Peralta          To Whom it may concern:      Please excuse from work 3/8/19 - 3/22/19 due to neck and low back pain.          Sincerely,    Semaj Victoria MD

## (undated) NOTE — MR AVS SNAPSHOT
[facility], [address], [place] [address]               Thank you for choosing us for your health care visit with Aliza Walter PA-C.   We are glad to serve you and happy to provide you with this lagos at [phone]. Thursday June 22, 2017     Imaging:  MRI SPINE LUMBAR ([id number])    Instructions:  IMPORTANT    Your physician has ordered a radiology test that may require authorization from your insurance company.   Your physician or the clinic st ? Please allow the office 2-3 business days to fill the prescription. ? Patient must present photo ID at time of .     ? Written prescriptions picked up on Fridays must be picked up between the hours of 8:00 AM-1:00 PM.     Scheduling Tests    If y Where to Get Your Medications      These medications were sent to [facility], [address], [place] [address], [address]     Phone:  [phone]    - gabapentin 300 MG Caps

## (undated) NOTE — MR AVS SNAPSHOT
[facility] [address] [place] [address] [address] [facility] [address] [phone]               Thank you for choosing us for your health care visit with Columbus Rubinstein, MD.  We are glad to serve you and happy to provide you wit Referral Details     Referred By    Referred To    Dianna Auguste, [facility] [address]   [address] [address]   Phone:  [phone]   Fax:  [phone]    Diagnoses:  Degenerative disc disease, lumbar   Protrud Your physician has referred you to a specialist.  Your physician or the clinic staff will provide you with the phone number you should call to schedule your appointment.      If you are confident that your benefit plan will not require a referral or authori Call [phone] for help. [employer] is NOT to be used for urgent needs. For medical emergencies, dial 911.         Educational Information     Healthy Diet and Regular Exercise  [employer] for making healthy food choices  -

## (undated) NOTE — LETTER
Date: 7/2/2018    Patient Name: Raulito Cardenas          To Whom it may concern:    Please excuse patient from work from 7/9/18 - 8/17/18 due to back and neck problems.          Sincerely,    Gerhardt Guile, MD

## (undated) NOTE — LETTER
[facility], [address] [place] [address] , [address] [address] [facility]  [phone]               3/29/2019      Luis Armando Alex,    We have attempted to reach you multiple times.  Please call our office at [phone] to stacy

## (undated) NOTE — LETTER
Date: 11/12/2018    Patient Name: Mikey Campos          To Whom it may concern:      Please excuse Mr. Carnes Cover from work from the period of 11/13/18 - 12/1/18. He may return to work on 12/2/18.         Sincerely,    Mahesh Luo MD

## (undated) NOTE — Clinical Note
I had the pleasure of seeing Niya Kumar on 10/25/2019. Please see my attached note. Rissa Scott MD FACSEMG--Surgery

## (undated) NOTE — LETTER
Date: 12/20/2019    Patient Name: Romel Velásquez          To Whom it may concern:      Patient should have the following restrictions at work from 12/20/19 - 1/3/20:       No overhead work  No lifting greater than 20 lbs.    No pushing or pulling work